# Patient Record
Sex: FEMALE | Race: WHITE | NOT HISPANIC OR LATINO | Employment: UNEMPLOYED | URBAN - METROPOLITAN AREA
[De-identification: names, ages, dates, MRNs, and addresses within clinical notes are randomized per-mention and may not be internally consistent; named-entity substitution may affect disease eponyms.]

---

## 2019-09-11 ENCOUNTER — LAB (OUTPATIENT)
Dept: LAB | Facility: HOSPITAL | Age: 49
End: 2019-09-11

## 2019-09-11 ENCOUNTER — TRANSCRIBE ORDERS (OUTPATIENT)
Dept: LAB | Facility: HOSPITAL | Age: 49
End: 2019-09-11

## 2019-09-11 DIAGNOSIS — R45.86 BRIEF COMPENSATORY MOOD SWINGS: ICD-10-CM

## 2019-09-11 DIAGNOSIS — R63.5 ABNORMAL WEIGHT GAIN: ICD-10-CM

## 2019-09-11 DIAGNOSIS — R45.86 BRIEF COMPENSATORY MOOD SWINGS: Primary | ICD-10-CM

## 2019-09-11 LAB
25(OH)D3 SERPL-MCNC: 42.5 NG/ML (ref 30–100)
ALBUMIN SERPL-MCNC: 4.6 G/DL (ref 3.5–5.2)
ALBUMIN/GLOB SERPL: 1.4 G/DL
ALP SERPL-CCNC: 65 U/L (ref 39–117)
ALT SERPL W P-5'-P-CCNC: 10 U/L (ref 1–33)
ANION GAP SERPL CALCULATED.3IONS-SCNC: 9.3 MMOL/L (ref 5–15)
AST SERPL-CCNC: 13 U/L (ref 1–32)
BILIRUB SERPL-MCNC: 0.4 MG/DL (ref 0.2–1.2)
BUN BLD-MCNC: 11 MG/DL (ref 6–20)
BUN/CREAT SERPL: 15.5 (ref 7–25)
CALCIUM SPEC-SCNC: 9.6 MG/DL (ref 8.6–10.5)
CHLORIDE SERPL-SCNC: 103 MMOL/L (ref 98–107)
CO2 SERPL-SCNC: 26.7 MMOL/L (ref 22–29)
CREAT BLD-MCNC: 0.71 MG/DL (ref 0.57–1)
DEPRECATED RDW RBC AUTO: 41.9 FL (ref 37–54)
ERYTHROCYTE [DISTWIDTH] IN BLOOD BY AUTOMATED COUNT: 12.9 % (ref 12.3–15.4)
GFR SERPL CREATININE-BSD FRML MDRD: 88 ML/MIN/1.73
GLOBULIN UR ELPH-MCNC: 3.3 GM/DL
GLUCOSE BLD-MCNC: 92 MG/DL (ref 65–99)
HBA1C MFR BLD: 5.71 % (ref 4.8–5.6)
HCT VFR BLD AUTO: 43.3 % (ref 34–46.6)
HGB BLD-MCNC: 14 G/DL (ref 12–15.9)
MCH RBC QN AUTO: 28.5 PG (ref 26.6–33)
MCHC RBC AUTO-ENTMCNC: 32.3 G/DL (ref 31.5–35.7)
MCV RBC AUTO: 88 FL (ref 79–97)
PLATELET # BLD AUTO: 196 10*3/MM3 (ref 140–450)
PMV BLD AUTO: 9.2 FL (ref 6–12)
POTASSIUM BLD-SCNC: 4.3 MMOL/L (ref 3.5–5.2)
PROT SERPL-MCNC: 7.9 G/DL (ref 6–8.5)
RBC # BLD AUTO: 4.92 10*6/MM3 (ref 3.77–5.28)
SODIUM BLD-SCNC: 139 MMOL/L (ref 136–145)
T4 SERPL-MCNC: 7.34 MCG/DL (ref 4.5–11.7)
TESTOST SERPL-MCNC: 23.9 NG/DL (ref 8.4–48.1)
TSH SERPL DL<=0.05 MIU/L-ACNC: 1.24 UIU/ML (ref 0.27–4.2)
VIT B12 BLD-MCNC: 419 PG/ML (ref 211–946)
WBC NRBC COR # BLD: 4.88 10*3/MM3 (ref 3.4–10.8)

## 2019-09-11 PROCEDURE — 83036 HEMOGLOBIN GLYCOSYLATED A1C: CPT | Performed by: NURSE PRACTITIONER

## 2019-09-11 PROCEDURE — 82306 VITAMIN D 25 HYDROXY: CPT | Performed by: NURSE PRACTITIONER

## 2019-09-11 PROCEDURE — 36415 COLL VENOUS BLD VENIPUNCTURE: CPT | Performed by: NURSE PRACTITIONER

## 2019-09-11 PROCEDURE — 84402 ASSAY OF FREE TESTOSTERONE: CPT

## 2019-09-11 PROCEDURE — 84403 ASSAY OF TOTAL TESTOSTERONE: CPT

## 2019-09-11 PROCEDURE — 82607 VITAMIN B-12: CPT

## 2019-09-11 PROCEDURE — 84436 ASSAY OF TOTAL THYROXINE: CPT

## 2019-09-11 PROCEDURE — 83525 ASSAY OF INSULIN: CPT

## 2019-09-11 PROCEDURE — 80050 GENERAL HEALTH PANEL: CPT | Performed by: NURSE PRACTITIONER

## 2019-09-13 LAB
INSULIN SERPL-ACNC: 9.1 UIU/ML
TESTOST FREE SERPL-MCNC: 1.5 PG/ML (ref 0–4.2)

## 2020-07-29 ENCOUNTER — LAB (OUTPATIENT)
Dept: LAB | Facility: HOSPITAL | Age: 50
End: 2020-07-29

## 2020-07-29 ENCOUNTER — TRANSCRIBE ORDERS (OUTPATIENT)
Dept: LAB | Facility: HOSPITAL | Age: 50
End: 2020-07-29

## 2020-07-29 DIAGNOSIS — R63.5 ABNORMAL WEIGHT GAIN: ICD-10-CM

## 2020-07-29 DIAGNOSIS — R63.5 ABNORMAL WEIGHT GAIN: Primary | ICD-10-CM

## 2020-07-29 LAB
ANION GAP SERPL CALCULATED.3IONS-SCNC: 9.1 MMOL/L (ref 5–15)
BUN SERPL-MCNC: 12 MG/DL (ref 6–20)
BUN/CREAT SERPL: 16.4 (ref 7–25)
CALCIUM SPEC-SCNC: 9.6 MG/DL (ref 8.6–10.5)
CHLORIDE SERPL-SCNC: 100 MMOL/L (ref 98–107)
CO2 SERPL-SCNC: 26.9 MMOL/L (ref 22–29)
CREAT SERPL-MCNC: 0.73 MG/DL (ref 0.57–1)
DEPRECATED RDW RBC AUTO: 41.6 FL (ref 37–54)
ERYTHROCYTE [DISTWIDTH] IN BLOOD BY AUTOMATED COUNT: 12.8 % (ref 12.3–15.4)
GFR SERPL CREATININE-BSD FRML MDRD: 85 ML/MIN/1.73
GLUCOSE SERPL-MCNC: 97 MG/DL (ref 65–99)
HBA1C MFR BLD: 5.67 % (ref 4.8–5.6)
HCT VFR BLD AUTO: 41.8 % (ref 34–46.6)
HGB BLD-MCNC: 13.3 G/DL (ref 12–15.9)
MCH RBC QN AUTO: 28.2 PG (ref 26.6–33)
MCHC RBC AUTO-ENTMCNC: 31.8 G/DL (ref 31.5–35.7)
MCV RBC AUTO: 88.6 FL (ref 79–97)
PLATELET # BLD AUTO: 220 10*3/MM3 (ref 140–450)
PMV BLD AUTO: 9 FL (ref 6–12)
POTASSIUM SERPL-SCNC: 4.2 MMOL/L (ref 3.5–5.2)
RBC # BLD AUTO: 4.72 10*6/MM3 (ref 3.77–5.28)
SODIUM SERPL-SCNC: 136 MMOL/L (ref 136–145)
T4 FREE SERPL-MCNC: 1.03 NG/DL (ref 0.93–1.7)
TSH SERPL DL<=0.05 MIU/L-ACNC: 0.85 UIU/ML (ref 0.27–4.2)
WBC # BLD AUTO: 4.44 10*3/MM3 (ref 3.4–10.8)

## 2020-07-29 PROCEDURE — 36415 COLL VENOUS BLD VENIPUNCTURE: CPT

## 2020-07-29 PROCEDURE — 80048 BASIC METABOLIC PNL TOTAL CA: CPT

## 2020-07-29 PROCEDURE — 83525 ASSAY OF INSULIN: CPT

## 2020-07-29 PROCEDURE — 83036 HEMOGLOBIN GLYCOSYLATED A1C: CPT

## 2020-07-29 PROCEDURE — 85027 COMPLETE CBC AUTOMATED: CPT

## 2020-07-29 PROCEDURE — 84443 ASSAY THYROID STIM HORMONE: CPT

## 2020-07-29 PROCEDURE — 84439 ASSAY OF FREE THYROXINE: CPT

## 2020-08-01 LAB — INSULIN SERPL-ACNC: 8.9 UIU/ML

## 2020-09-11 ENCOUNTER — TRANSCRIBE ORDERS (OUTPATIENT)
Dept: NUTRITION | Facility: HOSPITAL | Age: 50
End: 2020-09-11

## 2020-09-11 DIAGNOSIS — R63.5 ABNORMAL WEIGHT GAIN: Primary | ICD-10-CM

## 2020-09-21 ENCOUNTER — HOSPITAL ENCOUNTER (OUTPATIENT)
Dept: NUTRITION | Facility: HOSPITAL | Age: 50
Setting detail: RECURRING SERIES
Discharge: HOME | End: 2020-09-21

## 2020-09-21 VITALS — BODY MASS INDEX: 39.29 KG/M2 | WEIGHT: 235.8 LBS | HEIGHT: 65 IN

## 2020-09-21 PROCEDURE — 97802 MEDICAL NUTRITION INDIV IN: CPT

## 2020-09-21 NOTE — CONSULTS
Adult Outpatient Nutrition  Assessment/PES    Patient Name:  Luna Polk  YOB: 1970  MRN: [id number]    Assessment Date:  9/21/2020    Comments:  This medical referred consult was provided via telehealth as patient was unable to attend an in-office appointment today due to the COVID-19 crisis. Consent for treatment was given verbally. RD spent a total of 60 minutes with patient today. Pt was unable to complete the health literacy assessment prior to today's appt 2/2 telehealth. She discloses no barriers to learning at this time.     Pt reports problems with losing weight today. She has many food allergies that restrict her diet choices. She has been making changes over the past month to her diet that have been positive. Patient reports allergies to gluten, whey, casein, turkey, chicken, cow’s milk, peanuts, white potatoes pineapple, olives, mushrooms, and any yeast. She has been doing well with eliminating these foods from her diet. She reports her family hunts all of their meat so she consumes lean cuts of venison, pork, or beef and sometimes baked fish. She has been eating white rice or purple/yellow potatoes as her starches, and fresh, non-starchy vegetables. She likes to snack on a piece of fruit with peanut butter. Prior to making these changes 1 month ago she reports that she snacked on chips or sweets frequently. She has been overwhelmed with needing to make changes and is finally feeling more comfortable in doing so after starting with these initial changes to her diet.     RD spoke with pt about the importance of a balanced diet- incorporating all food groups to ensure proper nutrition for her body. She verbalized understanding and reports she has been trying to prepare balanced meals. RD shared recipe resource : www.diabetesfoodhub.org with pt to assist in meal planning. She has been tracking her calories and is comfortable using My Fitness Pal to continue doing so. VENANCIO spoke with pt  about the importance of portion sizes and nutrition label reading. Patient communicated understanding of these items and was able to utilize teach back when reading a nutrition facts label.   Patient has been working on her physical activity. She reports she walks 2 miles 3x per week with a friend and has been going to the gym for 60 minutes 2x per week with her . She reports she has only been doing cardio but would like to introduce strength training. RD encouraged pt to introduce as she feels comfortable. Patient seems motivated and I expect good adherence to goals set today.     Instructional process includes the plate method, nutrition label, meal planning, portions, restaurant nutrition, food record keeping, and exercise.    Materials provided include [facility] Weight Loss Toolkit, 1500 kcal meal plan and sample menu, and supporting nutrition education materials.     Goals:  1. Lose ½-1 lb per week- weigh self-weekly  2. Keep food diary 5 days per week via My Fitness Pal- adhere to 1500 kcal meal plan    Total of  60 minutes spent with patient on nutrition counseling. Education based on Academy of Dietetics and Nutrition guidelines. Patient was provided with RD's contact information. Follow up visit is scheduled for 10/12/20 at 10:30 AM . Thank you for this referral.       General Info     Row Name 09/21/20 1205       Today's Session    Person(s) attending today's session  Patient     Services Used Today?  No       General Information    How Well Do You Speak English?  very well    Do You Speak a Language Other Than English at Home?  no    Preferred Language  English    Are you able to read and write English?  Yes    Lives With  child(glenn), dependent;spouse    Is patient pregnant?  no        Physical Findings     Row Name 09/21/20 1205          Physical Findings    Overall Physical Appearance  -- unable to assess as visit was completed via phone         Anthropometrics     Row Name  "09/21/20 1227 09/21/20 1206       Anthropometrics    Height  165.1 cm (65\")  165.1 cm (65\")    Weight  --  107 kg (235 lb 12.8 oz)       Ideal Body Weight (IBW)    Ideal Body Weight (IBW) (kg)  57.29  57.29    % Ideal Body Weight  --  186.7       Body Mass Index (BMI)    BMI (kg/m2)  --  39.32        Nutritional Info/Activity     Row Name 09/21/20 1222       Nutritional Information    Have you had weight changes?  No    What is your desired body weight?  90.3 kg (199 lb)    Have you tried to lose weight before?  Yes    List programs tried, date, and success  low-carb- she did lose weight but it wasn't sustainable.    What is your motivation to lose weight?  Overall health    Food Allergies  other (see comments) many food allergies please see full note    History of eating disorder?  No    Do you have difficulty chewing food?  No    Functional Status  able to prepare meals;able to purchase food;ambulatory    List any food cravings/trigger foods you have  sweets and heavy carb starches    How often during the day do you find yourself snacking?  1-2 times per day    Food Behaviors  Stress eater;Boredom eater;Comfort eater    How often do you eat out and where?  1 x per week; sit down restaurant    Do you use Food Assistance programs (WIC, food stamps, food bank)?  no    Do you need information about Food Assistance programs?  no    How many times do you drink milk per day?  -- coconut milk 2 x per week    How many times do you eat fruit per day?  2    How many meals do you eat each day?  -- 1-2 per day    How many snacks do you eat each day?  -- 1-2 per day    What is the biggest challenge you have with your diet?  Knowledge;Food causing negative symptoms;Weight maintenance;Food preperation    What type of support do you currently use to help you with your health issues?   and friends    Enter everything you can remember eating in the last 24 hours (1 day)  AM: black coffee; L: 2 slices deli ham; D: 4 oz " "sirloin, house salad, Sprite @ [employer]       Eating Environment    Eating environment  Family;Social       Physical Activity    Are you currently involved in an activity/exercise program?   Yes    Describe physical activity  Walking 3 x per week; Gym 2x per week    How many minutes do you spend on exercise each day?  60    How would you rank exercise as an important health lifestyle practice?  10          Estimated/Assessed Needs     Row Name 09/21/20 1227 09/21/20 1206       Calculation Measurements    Weight Used For Calculations  107 kg (235 lb 12.8 oz)  --    Height  165.1 cm (65\")  165.1 cm (65\")       Estimated/Assessed Needs    Additional Documentation  [place]-St. Jeor Equation (Group)  --       [place]-St. Jeor Equation    RMR ([place]-St. Jeor Equation)  1700.455  --    [place]-St. Jeor Activity Factors  1.2  --    Activity Factors ([place]-St. Jeor)  2040.546  --                Problem/Interventions:  Problem 1     Row Name 09/21/20 1228          Nutrition Diagnoses Problem 1    Problem 1  Overweight/Obesity     Etiology (related to)  Factors Affecting Nutrition     Reported Allergies/Food Intolerance  -- see full note for allergies     Signs/Symptoms (evidenced by)  BMI     BMI  35 - 39.9                 Intervention Goal     Row Name 09/21/20 1229          Intervention Goal    General  Provide information regarding MNT for treatment/condition     PO  Meet estimated needs     Weight  Appropriate weight loss           Nutrition Prescription     Row Name 09/21/20 1231          Nutrition Prescription PO    PO Prescription  Begin/change diet     Begin/Change Diet to  Regular     Fluid Consistency  Thin     New PO Prescription Ordered?  No, recommended         Education/Evaluation     Row Name 09/21/20 1236          Education    Education  Provided education regarding     Provided education regarding  Diet rationale;Avoidance/improvement of symptoms;Key food habit change;Preventative health        " Monitor/Evaluation    Monitor  Per protocol;PO intake;Weight;Symptoms;Food/activity diary     Education Follow-up  Reinforce PRN will reinforce education @ f/u 10/12/20           Electronically signed by:  Tammi Estrada RD  09/21/20 12:37 EDT

## 2020-10-12 ENCOUNTER — HOSPITAL ENCOUNTER (OUTPATIENT)
Dept: NUTRITION | Facility: HOSPITAL | Age: 50
Setting detail: RECURRING SERIES
Discharge: HOME | End: 2020-10-12

## 2020-10-12 NOTE — PROGRESS NOTES
Outpatient Nutrition Services    Patient Name:  Luna Polk  YOB: 1970  MRN: [id number]  Admit Date:  10/12/2020    This medical referred consult follow-up was provided via telehealth as patient was unable to attend an in-office appointment today due to the COVID-19 crisis. Consent for treatment was given verbally. RD spent a total of 30 minutes with patient today.     Patient reports for her 1 month follow up by phone today. She is pleased with the progress she has been making overall. She reports that she sometimes struggles with consistent tracking on My Fitness Pal but has been trying to keep up. RD recommended pt track the night before if plausible to assist in cutting down on forgetfulness and time spent tracking throughout the day. Pt is agreeable to trying this. Patient reports that she does not eat large meals as she does not often find herself hungry throughout the day. She reports many of her tracked days are averaging ~ 1100 kcal per day rather than her goal of 1500 kcal per day. RD encouraged pt to incorporate a couple of healthy snacks into her day to get closer to her goal of 1500 kcal per day. Pt reports she will attempt this. Patient reports she has lost 6 lb since initial visit with her healthy habits implemented. Patient has been consistently walking with a friend 2-4 times per week and going to the gym for 45 minutes at least 2 times per week. She has begun to implement at least 20 minutes of strength training at the gym with a resistance arm machine and 5 and 8 lb free weights. She is feeling motivated and pleased with her progress at this time. RD encouraged pt to call with any questions. Continued follow-up scheduled for 12/7/20 @ 1030 AM. Thank you for this referral.        Goals:  1. Lose ½-1 lb per week- weigh self-weekly-100% met (6 lb down)  2. Keep food diary 5 days per week via My Fitness Pal- adhere to 1500 kcal meal plan-100% met       Electronically signed  by:  Tammi Estrada RD  10/12/20 10:52 EDT

## 2020-12-07 ENCOUNTER — HOSPITAL ENCOUNTER (OUTPATIENT)
Dept: NUTRITION | Facility: HOSPITAL | Age: 50
Setting detail: RECURRING SERIES
Discharge: HOME | End: 2020-12-07

## 2020-12-07 PROCEDURE — 97803 MED NUTRITION INDIV SUBSEQ: CPT

## 2020-12-07 NOTE — PROGRESS NOTES
"Outpatient Nutrition Services    Patient Name:  Luna Polk  YOB: 1970  MRN: [id number]  Admit Date:  12/7/2020    This medical referred consult follow-up was provided via telehealth as patient was unable to attend an in-office appointment today due to the COVID-19 crisis. Consent for treatment was given verbally. RD spent a total of 30 minutes with patient today.     Pt reports for her continued follow up visit via phone today. She states that she has \"fallen off the wagon\" as she has a lot of stressors at home currently. 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 She reports that she wants to get back on track as the way she has been eating is affecting her skin and sleep. RD spoke with pt about starting small. Commended pt for still making an effort to attend our appt today. We spoke about making sure she is eating protein first at meals and snacks to ensure she is not overdoing it on empty calorie snack foods. RD recommended pt focus on getting 64 oz water per day. She reports she is trying to keep up with healthy snacks. Her  is holding her accountable to get her to the gym again, she reports she plans to go tonight. Pt wants to make changes and effort is noticeable. RD providing pt with further healthy snack/high protein snack ideas for her to keep on hand due to her current busy lifestyle. A continued follow up visit has been scheduled for 1/12/2021 @ 11:30 AM.     Goals for next visit:  -64 oz water per day  -increased protein intake at meals and snacks   -speak with PCP and reschedule appt that has been cancelled       Electronically signed by:  Tammi Estrada RD  12/07/20 11:11 EST   "

## 2024-03-20 ENCOUNTER — OFFICE VISIT (OUTPATIENT)
Dept: INTERNAL MEDICINE | Facility: CLINIC | Age: 54
End: 2024-03-20
Payer: COMMERCIAL

## 2024-03-20 VITALS
WEIGHT: 214.8 LBS | OXYGEN SATURATION: 100 % | SYSTOLIC BLOOD PRESSURE: 128 MMHG | RESPIRATION RATE: 18 BRPM | DIASTOLIC BLOOD PRESSURE: 68 MMHG | TEMPERATURE: 97.9 F | HEART RATE: 76 BPM | HEIGHT: 65 IN | BODY MASS INDEX: 35.79 KG/M2

## 2024-03-20 DIAGNOSIS — Z13.0 SCREENING FOR ENDOCRINE, NUTRITIONAL, METABOLIC AND IMMUNITY DISORDER: ICD-10-CM

## 2024-03-20 DIAGNOSIS — Z13.228 SCREENING FOR ENDOCRINE, NUTRITIONAL, METABOLIC AND IMMUNITY DISORDER: ICD-10-CM

## 2024-03-20 DIAGNOSIS — Z00.00 PHYSICAL EXAM, ANNUAL: Primary | ICD-10-CM

## 2024-03-20 DIAGNOSIS — M25.50 PAIN IN JOINT INVOLVING MULTIPLE SITES: ICD-10-CM

## 2024-03-20 DIAGNOSIS — Z13.21 SCREENING FOR ENDOCRINE, NUTRITIONAL, METABOLIC AND IMMUNITY DISORDER: ICD-10-CM

## 2024-03-20 DIAGNOSIS — E55.9 VITAMIN D INSUFFICIENCY: ICD-10-CM

## 2024-03-20 DIAGNOSIS — Z13.29 SCREENING FOR ENDOCRINE, NUTRITIONAL, METABOLIC AND IMMUNITY DISORDER: ICD-10-CM
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
## 2024-03-25 LAB
25(OH)D3+25(OH)D2 SERPL-MCNC: 59.3 NG/ML (ref 30–100)
ALBUMIN SERPL-MCNC: 4.3 G/DL (ref 3.8–4.9)
ALBUMIN/GLOB SERPL: 1.3 {RATIO} (ref 1.2–2.2)
ALP SERPL-CCNC: 72 IU/L (ref 44–121)
ALT SERPL-CCNC: 10 IU/L (ref 0–32)
ANA SER QL IF: POSITIVE
ANA SPECKLED TITR SER: ABNORMAL {TITER}
AST SERPL-CCNC: 15 IU/L (ref 0–40)
B BURGDOR DNA SPEC QL NAA+PROBE: NEGATIVE
BASOPHILS # BLD AUTO: 0 X10E3/UL (ref 0–0.2)
BASOPHILS NFR BLD AUTO: 1 %
BILIRUB SERPL-MCNC: 0.3 MG/DL (ref 0–1.2)
BUN SERPL-MCNC: 14 MG/DL (ref 6–24)
BUN/CREAT SERPL: 18 (ref 9–23)
CALCIUM SERPL-MCNC: 9.8 MG/DL (ref 8.7–10.2)
CENTROMERE B AB SER-ACNC: <0.2 AI (ref 0–0.9)
CHLORIDE SERPL-SCNC: 102 MMOL/L (ref 96–106)
CHOLEST SERPL-MCNC: 219 MG/DL (ref 100–199)
CHROMATIN AB SERPL-ACNC: <0.2 AI (ref 0–0.9)
CO2 SERPL-SCNC: 22 MMOL/L (ref 20–29)
CREAT SERPL-MCNC: 0.8 MG/DL (ref 0.57–1)
CRP SERPL-MCNC: 4 MG/L (ref 0–10)
DSDNA AB SER-ACNC: <1 IU/ML (ref 0–9)
EGFRCR SERPLBLD CKD-EPI 2021: 89 ML/MIN/1.73
ENA JO1 AB SER-ACNC: <0.2 AI (ref 0–0.9)
ENA RNP AB SER-ACNC: 2.9 AI (ref 0–0.9)
ENA SCL70 AB SER-ACNC: <0.2 AI (ref 0–0.9)
ENA SM AB SER-ACNC: <0.2 AI (ref 0–0.9)
ENA SS-A AB SER-ACNC: >8 AI (ref 0–0.9)
ENA SS-B AB SER-ACNC: 0.3 AI (ref 0–0.9)
EOSINOPHIL # BLD AUTO: 0.1 X10E3/UL (ref 0–0.4)
EOSINOPHIL NFR BLD AUTO: 2 %
ERYTHROCYTE [DISTWIDTH] IN BLOOD BY AUTOMATED COUNT: 13.1 % (ref 11.7–15.4)
ERYTHROCYTE [SEDIMENTATION RATE] IN BLOOD BY WESTERGREN METHOD: 14 MM/HR (ref 0–40)
GLOBULIN SER CALC-MCNC: 3.2 G/DL (ref 1.5–4.5)
GLUCOSE SERPL-MCNC: 89 MG/DL (ref 70–99)
HBA1C MFR BLD: 5.3 % (ref 4.8–5.6)
HCT VFR BLD AUTO: 46.3 % (ref 34–46.6)
HDLC SERPL-MCNC: 78 MG/DL
HGB BLD-MCNC: 15 G/DL (ref 11.1–15.9)
IMM GRANULOCYTES # BLD AUTO: 0 X10E3/UL (ref 0–0.1)
IMM GRANULOCYTES NFR BLD AUTO: 0 %
LABORATORY COMMENT REPORT: ABNORMAL
LDLC SERPL CALC-MCNC: 125 MG/DL (ref 0–99)
LYMPHOCYTES # BLD AUTO: 1.6 X10E3/UL (ref 0.7–3.1)
LYMPHOCYTES NFR BLD AUTO: 39 %
Lab: ABNORMAL
Lab: ABNORMAL
MCH RBC QN AUTO: 28.5 PG (ref 26.6–33)
MCHC RBC AUTO-ENTMCNC: 32.4 G/DL (ref 31.5–35.7)
MCV RBC AUTO: 88 FL (ref 79–97)
MONOCYTES # BLD AUTO: 0.2 X10E3/UL (ref 0.1–0.9)
MONOCYTES NFR BLD AUTO: 6 %
NEUTROPHILS # BLD AUTO: 2.2 X10E3/UL (ref 1.4–7)
NEUTROPHILS NFR BLD AUTO: 52 %
PLATELET # BLD AUTO: 217 X10E3/UL (ref 150–450)
POTASSIUM SERPL-SCNC: 4.8 MMOL/L (ref 3.5–5.2)
PROT SERPL-MCNC: 7.5 G/DL (ref 6–8.5)
RBC # BLD AUTO: 5.26 X10E6/UL (ref 3.77–5.28)
RICKETTSIA RICKETTSII DNA, RT: NOT DETECTED
SODIUM SERPL-SCNC: 138 MMOL/L (ref 134–144)
T4 FREE SERPL-MCNC: 1.12 NG/DL (ref 0.82–1.77)
TRIGL SERPL-MCNC: 91 MG/DL (ref 0–149)
TSH SERPL DL<=0.005 MIU/L-ACNC: 1.19 UIU/ML (ref 0.45–4.5)
VIT B12 SERPL-MCNC: 644 PG/ML (ref 232–1245)
VLDLC SERPL CALC-MCNC: 16 MG/DL (ref 5–40)
WBC # BLD AUTO: 4.2 X10E3/UL (ref 3.4–10.8)

## 2024-03-26 ENCOUNTER — TELEPHONE (OUTPATIENT)
Dept: INTERNAL MEDICINE | Facility: CLINIC | Age: 54
End: 2024-03-26
Payer: COMMERCIAL

## 2024-03-26 NOTE — TELEPHONE ENCOUNTER
Caller: Luna Polk    Relationship: Self    Best call back number: [phone]    What form or medical record are you requesting: COPY OF LAB RESULTS FOR C REACTIVE PROTEIN, CBC, SED RATE, CMP AND EMANUEL    Who is requesting this form or medical record from you: DERMATOLOGIST    How would you like to receive the form or medical records (pick-up, mail, fax): FAX    If fax, what is the fax number: [phone]  If mail, what is the address:   If pick-up, provide patient with address and location details    Timeframe paperwork needed: ASAP    Additional notes: ATTMARTIN LEPE

## 2024-04-10 DIAGNOSIS — M25.50 PAIN IN JOINT INVOLVING MULTIPLE SITES: Primary | ICD-10-CM

## 2024-04-10 DIAGNOSIS — R76.8 POSITIVE ANA (ANTINUCLEAR ANTIBODY): ICD-10-CM

## 2024-08-01 ENCOUNTER — OFFICE VISIT (OUTPATIENT)
Dept: INTERNAL MEDICINE | Facility: CLINIC | Age: 54
End: 2024-08-01
Payer: COMMERCIAL

## 2024-08-01 VITALS
WEIGHT: 208 LBS | SYSTOLIC BLOOD PRESSURE: 120 MMHG | DIASTOLIC BLOOD PRESSURE: 84 MMHG | HEART RATE: 73 BPM | TEMPERATURE: 97.5 F | BODY MASS INDEX: 34.66 KG/M2 | OXYGEN SATURATION: 98 % | HEIGHT: 65 IN

## 2024-08-01 DIAGNOSIS — J45.41 MODERATE PERSISTENT ASTHMA WITH ACUTE EXACERBATION: Primary | ICD-10-CM

## 2024-08-01 PROCEDURE — 1126F AMNT PAIN NOTED NONE PRSNT: CPT | Performed by: STUDENT IN AN ORGANIZED HEALTH CARE EDUCATION/TRAINING PROGRAM

## 2024-08-01 PROCEDURE — 99214 OFFICE O/P EST MOD 30 MIN: CPT | Performed by: STUDENT IN AN ORGANIZED HEALTH CARE EDUCATION/TRAINING PROGRAM

## 2024-08-01 RX ORDER — HYDROXYCHLOROQUINE SULFATE 200 MG/1
400 TABLET, FILM COATED ORAL DAILY
COMMUNITY
Start: 2024-07-31 | End: 2024-09-29

## 2024-08-01 RX ORDER — PREDNISONE 10 MG/1
10 TABLET ORAL DAILY
Qty: 6 TABLET | Refills: 0 | Status: SHIPPED | OUTPATIENT
Start: 2024-08-01

## 2024-08-01 RX ORDER — ALBUTEROL SULFATE 90 UG/1
2 AEROSOL, METERED RESPIRATORY (INHALATION) 4 TIMES DAILY
COMMUNITY
Start: 2024-07-31

## 2024-08-01 RX ORDER — MOMETASONE FUROATE AND FORMOTEROL FUMARATE DIHYDRATE 200; 5 UG/1; UG/1
2 AEROSOL RESPIRATORY (INHALATION)
Qty: 1 EACH | Refills: 11 | Status: SHIPPED | OUTPATIENT
Start: 2024-08-01

## 2024-08-01 NOTE — PROGRESS NOTES
Subjective   Luna Polk is a 53 y.o. female.     History of Present Illness  Patient presents with complaints of acute asthma.  States that she was diagnosed with asthma recently and was put on albuterol inhaler though it has not been helpful.  She states over the past 3 or 4 days she feels like she cannot catch her breath.  States that her chest just feels really tight and she cannot get air in.  Denies cough.  States that the albuterol has been making her really jittery and keeping her from sleeping.  Denies wheeze or cough      The following portions of the patient's history were reviewed and updated as appropriate: allergies, current medications, past family history, past medical history, past social history, past surgical history, and problem list.    Review of Systems   All other systems reviewed and are negative.      Objective   Physical Exam  Vitals and nursing note reviewed.   Constitutional:       Appearance: Normal appearance.   HENT:      Head: Normocephalic and atraumatic.      Right Ear: External ear normal.      Left Ear: External ear normal.      Nose: Nose normal.      Mouth/Throat:      Mouth: Mucous membranes are moist.      Pharynx: Oropharynx is clear. No oropharyngeal exudate or posterior oropharyngeal erythema.   Eyes:      Extraocular Movements: Extraocular movements intact.      Conjunctiva/sclera: Conjunctivae normal.      Pupils: Pupils are equal, round, and reactive to light.   Cardiovascular:      Rate and Rhythm: Normal rate and regular rhythm.      Pulses: Normal pulses.      Heart sounds: Normal heart sounds.   Pulmonary:      Effort: Pulmonary effort is normal.      Breath sounds: Normal breath sounds.   Abdominal:      General: Abdomen is flat. Bowel sounds are normal.      Palpations: Abdomen is soft.   Musculoskeletal:         General: Normal range of motion.      Cervical back: Normal range of motion.   Skin:     General: Skin is warm.      Capillary Refill: Capillary  refill takes less than 2 seconds.   Neurological:      General: No focal deficit present.      Mental Status: She is alert and oriented to person, place, and time. Mental status is at baseline.   Psychiatric:         Mood and Affect: Mood normal.         Behavior: Behavior normal.         Thought Content: Thought content normal.         Judgment: Judgment normal.         Assessment & Plan   Diagnoses and all orders for this visit:    1. Moderate persistent asthma with acute exacerbation (Primary)  -     predniSONE (DELTASONE) 10 MG tablet; Take 1 tablet by mouth Daily.  Dispense: 6 tablet; Refill: 0  -     mometasone-formoterol (Dulera) 200-5 MCG/ACT inhaler; Inhale 2 puffs 2 (Two) Times a Day.  Dispense: 1 each; Refill: 11    Patient seen for chronic worsening condition  Started patient on a maintenance inhaler.  Counseled to use albuterol as needed on top of the maintenance inhaler.  Patient states that she does not like steroid packs as the beginning doses are too strong for her and she would prefer to take a smaller dose of steroid to help with her exacerbation.

## 2024-10-23 ENCOUNTER — OFFICE VISIT (OUTPATIENT)
Dept: INTERNAL MEDICINE | Facility: CLINIC | Age: 54
End: 2024-10-23
Payer: COMMERCIAL

## 2024-10-23 VITALS
TEMPERATURE: 98 F | RESPIRATION RATE: 17 BRPM | BODY MASS INDEX: 35.99 KG/M2 | DIASTOLIC BLOOD PRESSURE: 38 MMHG | HEART RATE: 71 BPM | WEIGHT: 216 LBS | OXYGEN SATURATION: 99 % | HEIGHT: 65 IN | SYSTOLIC BLOOD PRESSURE: 112 MMHG

## 2024-10-23 DIAGNOSIS — E66.812 CLASS 2 SEVERE OBESITY DUE TO EXCESS CALORIES WITH SERIOUS COMORBIDITY AND BODY MASS INDEX (BMI) OF 35.0 TO 35.9 IN ADULT: ICD-10-CM

## 2024-10-23 DIAGNOSIS — D89.89 AUTOIMMUNE DISORDER: ICD-10-CM

## 2024-10-23 DIAGNOSIS — J45.40 MODERATE PERSISTENT ASTHMA WITHOUT COMPLICATION: Primary | ICD-10-CM

## 2024-10-23 DIAGNOSIS — E66.01 CLASS 2 SEVERE OBESITY DUE TO EXCESS CALORIES WITH SERIOUS COMORBIDITY AND BODY MASS INDEX (BMI) OF 35.0 TO 35.9 IN ADULT: ICD-10-CM

## 2024-10-23 DIAGNOSIS — Z87.898 HISTORY OF PREDIABETES: ICD-10-CM

## 2024-10-23 DIAGNOSIS — K22.4 ESOPHAGEAL SPASM: ICD-10-CM

## 2024-10-23 DIAGNOSIS — Z12.31 ENCOUNTER FOR SCREENING MAMMOGRAM FOR MALIGNANT NEOPLASM OF BREAST: ICD-10-CM

## 2024-10-23 LAB
EXPIRATION DATE: NORMAL
HBA1C MFR BLD: 5 % (ref 4.5–5.7)
Lab: NORMAL

## 2024-10-23 PROCEDURE — 99214 OFFICE O/P EST MOD 30 MIN: CPT | Performed by: NURSE PRACTITIONER

## 2024-10-23 PROCEDURE — 83036 HEMOGLOBIN GLYCOSYLATED A1C: CPT | Performed by: NURSE PRACTITIONER

## 2024-10-23 PROCEDURE — 1126F AMNT PAIN NOTED NONE PRSNT: CPT | Performed by: NURSE PRACTITIONER

## 2024-10-23 PROCEDURE — 3044F HG A1C LEVEL LT 7.0%: CPT | Performed by: NURSE PRACTITIONER

## 2024-10-23 PROCEDURE — 1159F MED LIST DOCD IN RCRD: CPT | Performed by: NURSE PRACTITIONER

## 2024-10-23 PROCEDURE — 1160F RVW MEDS BY RX/DR IN RCRD: CPT | Performed by: NURSE PRACTITIONER

## 2024-10-23 RX ORDER — BUDESONIDE AND FORMOTEROL FUMARATE DIHYDRATE 80; 4.5 UG/1; UG/1
2 AEROSOL RESPIRATORY (INHALATION)
Qty: 10.2 G | Refills: 6 | Status: SHIPPED | OUTPATIENT
Start: 2024-10-23

## 2024-10-23 RX ORDER — HYDROXYCHLOROQUINE SULFATE 200 MG/1
TABLET, FILM COATED ORAL 2 TIMES DAILY
COMMUNITY

## 2024-10-23 NOTE — PROGRESS NOTES
Chief Complaint / Reason:      Chief Complaint   Patient presents with    Asthma     F/u from last visit w/ Dr. Ohara        Subjective     History of Present Illness  The patient is a 54-year-old female here for a follow-up visit with Dr. Ohara    She has a history of asthma, confirmed through a pulmonary function test, and has been under the care of a pulmonologist. She was prescribed a Ventolin inhaler and later Dulera by Dr. Ohara. However, she does not like Dulera due to her anxiety and has not taken it for the past 2 days, resulting in wheezing. Cold weather exacerbates her asthma symptoms.    She also reports experiencing esophageal spasms and lightheadedness.     Her medical history includes lupus, Sjogren's syndrome, and Raynaud's phenomenon. She has been on a low dose of steroids and has gained some weight. She walks every morning.    She underwent a colonoscopy, which showed no abnormalities. In 10/2023, she had a myomectomy, but the fibroids were not removed. She is due for her annual mammogram and is considering a hysterectomy due to the presence of large fibroids.    History taken from: patient    PMH/FH/Social History were reviewed and updated appropriately in the electronic medical record.   Past Medical History:   Diagnosis Date    Uterine fibroid      Past Surgical History:   Procedure Laterality Date     SECTION      UTERINE FIBROID SURGERY       Social History     Socioeconomic History    Marital status:    Tobacco Use    Smoking status: Former     Current packs/day: 1.00     Average packs/day: 1 pack/day for 25.0 years (25.0 ttl pk-yrs)     Types: Cigarettes     Start date: 1999    Smokeless tobacco: Never   Vaping Use    Vaping status: Never Used   Substance and Sexual Activity    Alcohol use: Yes     Comment: occ    Drug use: Defer    Sexual activity: Defer     Family History   Problem Relation Age of Onset    Dementia Mother     Lung cancer Father        Review of  Systems:   Review of Systems      All other systems were reviewed and are negative.  Exceptions are noted in the subjective or above.      Objective     Vital Signs  Vitals:    10/23/24 1248   BP: (!) 112/38   Pulse: 71   Resp: 17   Temp: 98 °F (36.7 °C)   SpO2: 99%       Body mass index is 35.94 kg/m².  Class 2 Severe Obesity (BMI >=35 and <=39.9). Obesity-related health conditions include the following: dyslipidemias and GERD. Obesity is improving with lifestyle modifications. BMI is is above average; BMI management plan is completed. We discussed low calorie, low carb based diet program, portion control, increasing exercise, joining a fitness center or start home based exercise program, Weight Watchers or other Commercial based weight reduction program, and management of depression/anxiety/stress to control compensatory eating.       Physical Exam  Vitals and nursing note reviewed.   Constitutional:       General: She is not in acute distress.     Appearance: She is well-developed. She is obese.   Cardiovascular:      Rate and Rhythm: Normal rate and regular rhythm.      Pulses: Normal pulses.      Heart sounds: Normal heart sounds.   Pulmonary:      Effort: Pulmonary effort is normal.      Breath sounds: Wheezing present.   Chest:      Chest wall: Tenderness present.   Musculoskeletal:         General: Tenderness and deformity present. Normal range of motion.   Skin:     General: Skin is warm and dry.      Capillary Refill: Capillary refill takes less than 2 seconds.      Coloration: Skin is not pale.      Findings: No erythema or rash.   Neurological:      Mental Status: She is alert and oriented to person, place, and time.   Psychiatric:         Behavior: Behavior normal.         Thought Content: Thought content normal.         Judgment: Judgment normal.              Results Review:    I reviewed the patient's new clinical results.       Medication Review:     Current Outpatient Medications:     albuterol  sulfate  (90 Base) MCG/ACT inhaler, Inhale 2 puffs 4 (Four) Times a Day., Disp: , Rfl:     hydroxychloroquine (PLAQUENIL) 200 MG tablet, Take  by mouth 2 (Two) Times a Day., Disp: , Rfl:     budesonide-formoterol (Symbicort) 80-4.5 MCG/ACT inhaler, Inhale 2 puffs 2 (Two) Times a Day., Disp: 10.2 g, Rfl: 6    Diagnoses and all orders for this visit:    Moderate persistent asthma without complication  -     budesonide-formoterol (Symbicort) 80-4.5 MCG/ACT inhaler; Inhale 2 puffs 2 (Two) Times a Day.    History of prediabetes  -     POC Glycosylated Hemoglobin (Hb A1C)    Encounter for screening mammogram for malignant neoplasm of breast  -     Mammo Screening Digital Tomosynthesis Bilateral With CAD    Autoimmune disorder  -     hydroxychloroquine (PLAQUENIL) 200 MG tablet; Take  by mouth 2 (Two) Times a Day.    Esophageal spasm    Class 2 severe obesity due to excess calories with serious comorbidity and body mass index (BMI) of 35.0 to 35.9 in adult      Assessment & Plan  1. Asthma.  Her pulmonary function test indicates a significant bronchodilator response, suggesting a potential diagnosis of asthma. She reports anxiety with the use of Dulera and has been prescribed Symbicort 80 mcg for daily use. She is advised to rinse her mouth after use and maintain good oral hygiene. She should carry her albuterol inhaler for use every 4 to 6 hours as needed. Regular walking and deep breathing exercises have been recommended to improve lung function. She is also advised to monitor her blood pressure at home due to a recent low reading of 112/38.    2. Esophageal spasms.  She reports experiencing esophageal spasms, particularly when walking outside in cool weather. She is advised to continue using peppermint as it provides relief.    3. Multiple autoimmune conditions (Lupus, Sjogren's, Raynaud's).  She has been diagnosed with multiple autoimmune conditions including lupus, Sjogren's, and Raynaud's. She is currently on  hydroxychloroquine and reports significant improvement in symptoms after consistent use.    4. Health Maintenance.  A mammogram has been ordered. She is advised to consult with her gynecologist regarding her fibroids, especially considering her myomectomy last October. She will provide the date of her last colonoscopy via [employer].      Return if symptoms worsen or fail to improve.    TACO Guevara  10/23/2024    Patient or patient representative verbalized consent for the use of Ambient Listening during the visit with  TACO Guevara for chart documentation.

## 2025-03-13 ENCOUNTER — OFFICE VISIT (OUTPATIENT)
Dept: INTERNAL MEDICINE | Facility: CLINIC | Age: 55
End: 2025-03-13
Payer: COMMERCIAL

## 2025-03-13 VITALS
HEART RATE: 63 BPM | BODY MASS INDEX: 35.65 KG/M2 | OXYGEN SATURATION: 97 % | SYSTOLIC BLOOD PRESSURE: 110 MMHG | HEIGHT: 65 IN | TEMPERATURE: 97.3 F | WEIGHT: 214 LBS | DIASTOLIC BLOOD PRESSURE: 78 MMHG

## 2025-03-13 DIAGNOSIS — R06.83 SNORING: Primary | ICD-10-CM

## 2025-03-13 DIAGNOSIS — E66.812 CLASS 2 SEVERE OBESITY DUE TO EXCESS CALORIES WITH SERIOUS COMORBIDITY AND BODY MASS INDEX (BMI) OF 35.0 TO 35.9 IN ADULT: ICD-10-CM

## 2025-03-13 DIAGNOSIS — E66.01 CLASS 2 SEVERE OBESITY DUE TO EXCESS CALORIES WITH SERIOUS COMORBIDITY AND BODY MASS INDEX (BMI) OF 35.0 TO 35.9 IN ADULT: ICD-10-CM

## 2025-03-13 DIAGNOSIS — D89.89 AUTOIMMUNE DISORDER: ICD-10-CM

## 2025-03-13 PROBLEM — K21.9 ACID REFLUX: Status: ACTIVE | Noted: 2024-05-06

## 2025-03-13 PROBLEM — K22.4 DIFFUSE SPASM OF ESOPHAGUS: Status: ACTIVE | Noted: 2024-07-12

## 2025-03-13 PROBLEM — E66.9 OBESITY: Status: ACTIVE | Noted: 2025-03-13

## 2025-03-13 PROCEDURE — 99214 OFFICE O/P EST MOD 30 MIN: CPT | Performed by: NURSE PRACTITIONER

## 2025-03-13 PROCEDURE — 1159F MED LIST DOCD IN RCRD: CPT | Performed by: NURSE PRACTITIONER

## 2025-03-13 PROCEDURE — 1126F AMNT PAIN NOTED NONE PRSNT: CPT | Performed by: NURSE PRACTITIONER

## 2025-03-13 PROCEDURE — 1160F RVW MEDS BY RX/DR IN RCRD: CPT | Performed by: NURSE PRACTITIONER

## 2025-03-13 RX ORDER — PREDNISONE 5 MG/1
5 TABLET ORAL DAILY
COMMUNITY

## 2025-03-13 NOTE — PROGRESS NOTES
Chief Complaint / Reason:      Chief Complaint   Patient presents with    Fibroids     Weight loss       Subjective     History of Present Illness  The patient is a 54-year-old female presenting for weight loss and uterine fibroids.    Diagnosed with uterine fibroids, unsuccessfully removed by Dr. Enriquez last year due to size. Declined hysterectomy. Fibroids not adherent to bowel wall, no complications. Underwent ablation in 2024, resulting in amenorrhea. Reports intermittent cramping and spotting over the past two months. Concerned about fibroid rupture or malignancy.    History of lupus with three flare-ups since starting medication. Occasionally takes prednisone. Reports difficulty concentrating, attributing it to lifelong ADHD. Despite adequate sleep, experiences morning fatigue. No sleep study done. Wakes up once at night around 3 AM, does not always snore, occasionally naps around 4 PM. Feels mentally scattered, hormones not recently checked.    Previously discussed semaglutide for weight loss but found it unaffordable. Inquires about Rybelsus, prescribed to her non-diabetic sister. Lost 119 pounds on semaglutide over a year, now weighs 198 pounds. Tried other weight loss medications, has not tried Zepbound due to lack of prescription.    Has asthma, finds Symbicort more effective than previous treatments but still experiences tightness. No smoking for 30 years. Reports no chest pain but experiences wheezing. No pulmonary function tests done.    SOCIAL HISTORY  She has not smoked for 30 years.    MEDICATIONS  Current: Prednisone, Symbicort, semaglutide.    History taken from: patient    PMH/FH/Social History were reviewed and updated appropriately in the electronic medical record.   Past Medical History:   Diagnosis Date    Uterine fibroid      Past Surgical History:   Procedure Laterality Date     SECTION      UTERINE FIBROID SURGERY       Social History     Socioeconomic History    Marital  status:    Tobacco Use    Smoking status: Former     Current packs/day: 0.00     Average packs/day: 1 pack/day for 10.8 years (10.8 ttl pk-yrs)     Types: Cigarettes     Start date:      Quit date: 1999     Years since quittin.3    Smokeless tobacco: Never   Vaping Use    Vaping status: Never Used   Substance and Sexual Activity    Alcohol use: Yes     Comment: occ    Drug use: Defer    Sexual activity: Defer     Family History   Problem Relation Age of Onset    Dementia Mother     Lung cancer Father        Review of Systems:   Review of Systems      All other systems were reviewed and are negative.  Exceptions are noted in the subjective or above.      Objective     Vital Signs  Vitals:    25 1448   BP: 110/78   Pulse: 63   Temp: 97.3 °F (36.3 °C)   SpO2: 97%       Body mass index is 35.61 kg/m².  Class 2 Severe Obesity (BMI >=35 and <=39.9). Obesity-related health conditions include the following: impaired fasting glucose, dyslipidemias, and GERD. Obesity is worsening. BMI is is above average; BMI management plan is completed. We discussed low calorie, low carb based diet program, portion control, increasing exercise, joining a fitness center or start home based exercise program, Weight Watchers or other Commercial based weight reduction program, and management of depression/anxiety/stress to control compensatory eating.       Physical Exam  Vitals and nursing note reviewed.   Constitutional:       General: She is not in acute distress.     Appearance: She is well-developed. She is obese.   Cardiovascular:      Rate and Rhythm: Normal rate and regular rhythm.      Pulses: Normal pulses.      Heart sounds: Normal heart sounds.   Pulmonary:      Effort: Pulmonary effort is normal.      Breath sounds: Normal breath sounds. No wheezing.   Chest:      Chest wall: No tenderness.   Musculoskeletal:         General: Tenderness and deformity present. Normal range of motion.   Skin:     General:  Skin is warm and dry.      Capillary Refill: Capillary refill takes less than 2 seconds.      Coloration: Skin is not pale.      Findings: No erythema or rash.   Neurological:      Mental Status: She is alert and oriented to person, place, and time.   Psychiatric:         Behavior: Behavior normal.         Thought Content: Thought content normal.         Judgment: Judgment normal.              Results Review:    I reviewed the patient's new clinical results.       Medication Review:     Current Outpatient Medications:     albuterol sulfate  (90 Base) MCG/ACT inhaler, Inhale 2 puffs 4 (Four) Times a Day., Disp: , Rfl:     budesonide-formoterol (Symbicort) 80-4.5 MCG/ACT inhaler, Inhale 2 puffs 2 (Two) Times a Day., Disp: 10.2 g, Rfl: 6    hydroxychloroquine (PLAQUENIL) 200 MG tablet, Take  by mouth 2 (Two) Times a Day., Disp: , Rfl:     predniSONE (DELTASONE) 5 MG tablet, Take 1 tablet by mouth Daily., Disp: , Rfl:     Tirzepatide-Weight Management (ZEPBOUND) 2.5 MG/0.5ML solution auto-injector, Inject 0.5 mL under the skin into the appropriate area as directed 1 (One) Time Per Week., Disp: 2 mL, Rfl: 1    Diagnoses and all orders for this visit:    Snoring  -     Ambulatory Referral to Sleep Medicine    Class 2 severe obesity due to excess calories with serious comorbidity and body mass index (BMI) of 35.0 to 35.9 in adult  -     Tirzepatide-Weight Management (ZEPBOUND) 2.5 MG/0.5ML solution auto-injector; Inject 0.5 mL under the skin into the appropriate area as directed 1 (One) Time Per Week.    Autoimmune disorder    Other orders  -     predniSONE (DELTASONE) 5 MG tablet; Take 1 tablet by mouth Daily.      Assessment & Plan  1. Uterine fibroids  - No gastrointestinal issues or pain  - Not adherent to bowel wall, typically benign  - Pap smear today  - Consult Dr. Enriquez for transvaginal ultrasound    2. Lupus  - Three flare-ups since starting medication  - Occasionally takes prednisone  - Continue current  medication  - Follow up with rheumatologist as needed    3. Asthma  - Symbicort improved symptoms by 90%  - Still feels tightness and wheezing  - Oxygen saturation: 97%  - Continue Symbicort  - Consider higher dose or alternative inhalers if symptoms persist    4. Sleep apnea  - Oxygen saturation: 97% history of asthma/smoking  - Small airway  - Order sleep study referral  - Wear watch to monitor sleep patterns    5. Weight management  - BMI: 35  - A1c: 5.0  - Prescribed Zepbound 2.5 mg  - Advised to visit [employer] website for coupon  - Ensure adequate protein intake for weight loss    PROCEDURE  Underwent ablation in October 2024.    Return if symptoms worsen or fail to improve.    TACO Guevara  03/13/2025    Patient or patient representative verbalized consent for the use of Ambient Listening during the visit with  TACO Guevara for chart documentation.

## 2025-04-21 DIAGNOSIS — J45.40 MODERATE PERSISTENT ASTHMA WITHOUT COMPLICATION: ICD-10-CM

## 2025-04-21 RX ORDER — BUDESONIDE AND FORMOTEROL FUMARATE DIHYDRATE 80; 4.5 UG/1; UG/1
2 AEROSOL RESPIRATORY (INHALATION)
Qty: 10.2 G | Refills: 6 | Status: SHIPPED | OUTPATIENT
Start: 2025-04-21

## 2025-05-14 ENCOUNTER — OFFICE VISIT (OUTPATIENT)
Dept: INTERNAL MEDICINE | Facility: CLINIC | Age: 55
End: 2025-05-14
Payer: COMMERCIAL

## 2025-05-14 VITALS
TEMPERATURE: 98.2 F | OXYGEN SATURATION: 97 % | BODY MASS INDEX: 36.49 KG/M2 | DIASTOLIC BLOOD PRESSURE: 82 MMHG | WEIGHT: 219 LBS | SYSTOLIC BLOOD PRESSURE: 104 MMHG | HEART RATE: 67 BPM | HEIGHT: 65 IN

## 2025-05-14 DIAGNOSIS — R76.8 POSITIVE ANA (ANTINUCLEAR ANTIBODY): ICD-10-CM

## 2025-05-14 DIAGNOSIS — Z12.31 ENCOUNTER FOR SCREENING MAMMOGRAM FOR MALIGNANT NEOPLASM OF BREAST: ICD-10-CM

## 2025-05-14 DIAGNOSIS — Z13.21 SCREENING FOR ENDOCRINE, NUTRITIONAL, METABOLIC AND IMMUNITY DISORDER: ICD-10-CM

## 2025-05-14 DIAGNOSIS — Z00.00 PHYSICAL EXAM, ANNUAL: Primary | ICD-10-CM

## 2025-05-14 DIAGNOSIS — E66.01 CLASS 2 SEVERE OBESITY DUE TO EXCESS CALORIES WITH SERIOUS COMORBIDITY AND BODY MASS INDEX (BMI) OF 35.0 TO 35.9 IN ADULT: ICD-10-CM

## 2025-05-14 DIAGNOSIS — L25.5 DERMATITIS DUE TO PLANTS, INCLUDING POISON IVY, SUMAC, AND OAK: ICD-10-CM

## 2025-05-14 DIAGNOSIS — Z13.228 SCREENING FOR ENDOCRINE, NUTRITIONAL, METABOLIC AND IMMUNITY DISORDER: ICD-10-CM

## 2025-05-14 DIAGNOSIS — E55.9 VITAMIN D INSUFFICIENCY: ICD-10-CM

## 2025-05-14 DIAGNOSIS — Z13.29 SCREENING FOR ENDOCRINE, NUTRITIONAL, METABOLIC AND IMMUNITY DISORDER: ICD-10-CM

## 2025-05-14 DIAGNOSIS — Z13.0 SCREENING FOR ENDOCRINE, NUTRITIONAL, METABOLIC AND IMMUNITY DISORDER: ICD-10-CM

## 2025-05-14 DIAGNOSIS — E66.812 CLASS 2 SEVERE OBESITY DUE TO EXCESS CALORIES WITH SERIOUS COMORBIDITY AND BODY MASS INDEX (BMI) OF 35.0 TO 35.9 IN ADULT: ICD-10-CM

## 2025-05-14 DIAGNOSIS — M79.672 LEFT FOOT PAIN: ICD-10-CM

## 2025-05-14 RX ORDER — METHYLPREDNISOLONE ACETATE 40 MG/ML
40 INJECTION, SUSPENSION INTRA-ARTICULAR; INTRALESIONAL; INTRAMUSCULAR; SOFT TISSUE ONCE
Status: COMPLETED | OUTPATIENT
Start: 2025-05-14 | End: 2025-05-14

## 2025-05-14 RX ORDER — TIRZEPATIDE 2.5 MG/.5ML
2.5 INJECTION, SOLUTION SUBCUTANEOUS WEEKLY
Qty: 2 ML | Refills: 2 | Status: SHIPPED | OUTPATIENT
Start: 2025-05-14

## 2025-05-14 RX ORDER — TRIAMCINOLONE ACETONIDE 1 MG/G
1 CREAM TOPICAL 2 TIMES DAILY
Qty: 80 G | Refills: 0 | Status: SHIPPED | OUTPATIENT
Start: 2025-05-14

## 2025-05-14 RX ADMIN — METHYLPREDNISOLONE ACETATE 40 MG: 40 INJECTION, SUSPENSION INTRA-ARTICULAR; INTRALESIONAL; INTRAMUSCULAR; SOFT TISSUE at 09:47

## 2025-05-14 NOTE — PROGRESS NOTES
Chief Complaint   Patient presents with    Annual Exam    Foot Pain     Left foot is hurting when standing off  and on [id number], worsening now     Poison Ivy     All over back of legs and butt and lower back        Luna Polk is a 54 y.o. female and is here for a comprehensive physical exam.   History of Present Illness  The patient presents for an annual physical and also wishes to discuss a rash from poison ivy and left foot pain.    She reports a recent encounter with poison ivy, resulting in a rash on her back, legs, and buttocks. She is concerned about the potential exacerbation of her symptoms with steroid treatment and has been using Benadryl lotion for symptom management. She recalls a previous treatment with Depo-Medrol.    She is scheduled for a Pap smear on 05/19/2025 and plans to arrange a mammogram. She has an upcoming sleep study appointment in 06/2025. She is current with her vision and dental check-ups, having recently undergone a dental cleaning. She maintains regular follow-ups with her rheumatologist. She underwent a colonoscopy last year, which yielded normal results. She had a dermatology appointment a few weeks ago, but it was canceled due to insurance issues.     She reports no chest pain or shortness of breath. She experiences occasional left lower quadrant pain, described as a dropping sensation. She retains her ovaries and reports normal bowel movements. She has a history of large fibroids.    She experiences intermittent pain in her left foot, which she describes as feeling like a broken bone when seated in certain positions. She is right-hand dominant. She recently noticed the loss of her little toenail, suggesting a possible stubbing incident. No imaging studies have been conducted on her foot.    FAMILY HISTORY  She does not have a family history of colon cancer.        History:  LMP: No LMP recorded (lmp unknown). Patient has had an ablation.  Last pap date: 2023  Abnormal pap?  yes  : 3  Para: 3  STD:none  Age of menarche:16  Sexually active:20  Abuse:none       Do you take any herbs or supplements that were not prescribed by a doctor? yes  Are you taking calcium supplements? no  Are you taking aspirin daily? no      Health Habits:  Dental Exam. up to date  Eye Exam. up to date  Dermatology.not up to date - advised patient to schedule or closely monitor for changes in skin lesions  Exercise: 4 times/week.  Current exercise activities include: walking    Health Maintenance   Topic Date Due    MAMMOGRAM  Never done    COLORECTAL CANCER SCREENING  Never done    ANNUAL PHYSICAL  2025    COVID-19 Vaccine ( - - season) 2026 (Originally 2024)    Pneumococcal Vaccine 50+ (1 of 2 - PCV) 2026 (Originally 1991)    ZOSTER VACCINE (1 of 2) 2026 (Originally 2022)    INFLUENZA VACCINE  2025    PAP SMEAR  10/01/2026    TDAP/TD VACCINES (4 - Td or Tdap) 2031    HEPATITIS C SCREENING  Completed       PMH, PSH, SocHx, FamHx, Allergies, and Medications: Reviewed and updated in the Visit Navigator.     Allergies   Allergen Reactions    Morphine Rash and Swelling    Penicillins Rash and Swelling    Apple Nausea And Vomiting    Gluten Meal Itching    Milk-Related Compounds Diarrhea    Mushroom Diarrhea    Sesame Seed Diarrhea    Soy Allergy (Obsolete) Diarrhea     Past Medical History:   Diagnosis Date    Uterine fibroid      Past Surgical History:   Procedure Laterality Date     SECTION      UTERINE FIBROID SURGERY       Social History     Socioeconomic History    Marital status:    Tobacco Use    Smoking status: Former     Current packs/day: 0.00     Average packs/day: 1 pack/day for 10.8 years (10.8 ttl pk-yrs)     Types: Cigarettes     Start date:      Quit date: 1999     Years since quittin.5    Smokeless tobacco: Never   Vaping Use    Vaping status: Never Used   Substance and Sexual Activity    Alcohol use: Yes  "    Comment: occ    Drug use: Defer    Sexual activity: Defer     Family History   Problem Relation Age of Onset    Dementia Mother     Lung cancer Father        Review of Systems  Review of Systems      Vitals:    05/14/25 0843   BP: 104/82   Pulse: 67   Temp: 98.2 °F (36.8 °C)   SpO2: 97%       Objective   /82   Pulse 67   Temp 98.2 °F (36.8 °C)   Ht 165.1 cm (65\")   Wt 99.3 kg (219 lb)   LMP  (LMP Unknown)   SpO2 97%   BMI 36.44 kg/m²           Physical Exam  Vitals and nursing note reviewed.   Constitutional:       General: She is not in acute distress.     Appearance: Normal appearance. She is well-developed. She is obese.   HENT:      Head: Normocephalic and atraumatic.      Right Ear: Hearing, tympanic membrane and ear canal normal.      Left Ear: Hearing, tympanic membrane and ear canal normal.      Nose: Nose normal.      Mouth/Throat:      Mouth: Mucous membranes are dry.      Dentition: Normal dentition.   Eyes:      Conjunctiva/sclera: Conjunctivae normal.      Pupils: Pupils are equal, round, and reactive to light.   Neck:      Thyroid: No thyroid mass or thyromegaly.      Vascular: No carotid bruit or JVD.   Cardiovascular:      Rate and Rhythm: Normal rate and regular rhythm.      Pulses: Normal pulses.      Heart sounds: Normal heart sounds, S1 normal and S2 normal. No murmur heard.  Pulmonary:      Effort: Pulmonary effort is normal.      Breath sounds: Normal breath sounds.   Abdominal:      General: Bowel sounds are normal. There is no distension or abdominal bruit.      Palpations: Abdomen is soft. There is no mass.      Tenderness: There is no abdominal tenderness. There is no right CVA tenderness, left CVA tenderness, guarding or rebound.      Hernia: No hernia is present.   Genitourinary:     Comments: deferred  Musculoskeletal:         General: Tenderness and deformity present. Normal range of motion.      Cervical back: Normal range of motion and neck supple.   Lymphadenopathy: "      Cervical: No cervical adenopathy.   Skin:     General: Skin is warm and dry.      Capillary Refill: Capillary refill takes less than 2 seconds.      Findings: Lesion present. No rash.      Nails: There is no clubbing.   Neurological:      Mental Status: She is alert and oriented to person, place, and time.      Cranial Nerves: No cranial nerve deficit.      Sensory: No sensory deficit.      Gait: Gait normal.   Psychiatric:         Behavior: Behavior normal.         Thought Content: Thought content normal.         Judgment: Judgment normal.              Assessment & Plan   1. Healthy female exam.    2. Patient Counseling: Including but not Limited to the following, when appropriate:  --Nutrition: Stressed importance of moderation in sodium/caffeine intake, saturated fat and cholesterol, caloric balance, sufficient intake of fresh fruits, vegetables, fiber, calcium, iron, and 1 mg of folate supplement per day (for females capable of pregnancy).  --Discussed the issue of estrogen replacement, calcium supplement, and the daily use of baby aspirin.  --Exercise: Stressed the importance of regular exercise.   --Substance Abuse: Discussed cessation/primary prevention of tobacco, alcohol, or other drug use; driving or other dangerous activities under the influence; availability of treatment for abuse, as indicated based on social history.    --Sexuality: Discussed sexually transmitted diseases, partner selection, use of condoms, avoidance of unintended pregnancy  and contraceptive alternatives.   --Injury prevention: Discussed safety belts, safety helmets, smoke detector, smoking near bedding or upholstery.   --Dental health: Discussed importance of regular tooth brushing, flossing, and dental visits.  --Immunizations reviewed.  --Discussed benefits of colon cancer screening.      3. Discussed the patient's BMI with her.  The BMI is above average; BMI management plan is completed  4. Return in about 4 weeks (around  6/11/2025), or if symptoms worsen or fail to improve.  5. Age-appropriate Screening Scheduled      Assessment & Plan   Assessment & Plan  1. Poison Ivy.  - Presents with a rash on her back, legs, and buttocks, consistent with poison ivy exposure.  - Comprehensive blood work panel will be ordered prior to the administration of a Depo-Medrol injection.  - Advised to maintain good hygiene practices, including washing hands, phone, and sheets, and to use Ana Luisa dishwashing liquid.  - Prescription for triamcinolone cream provided, with instructions to avoid facial application. Cautioned against scratching the affected areas.    2. Health Maintenance.  - Scheduled for a Pap smear on 05/19/2025 and advised to schedule a mammogram online while awaiting blood work results.  - Prescription for Zepbound issued through [facility].  - Instructed to provide the report from her previous colonoscopy for scanning purposes.  - Encouraged to maintain good posture, perform regular breast self-examinations, engage in regular exercise, and ensure adequate protein intake.    3. Left Foot Pain.  - Reports intermittent pain in her left foot, which may be due to bone spurs.  - Advised to wear more supportive footwear.  - If symptoms worsen, she will inform via [employer], at which point a referral to a podiatrist will be considered.    Diagnoses and all orders for this visit:    1. Physical exam, annual (Primary)  -     Comprehensive Metabolic Panel  -     Lipid Panel  -     CBC Auto Differential    2. Vitamin D insufficiency  -     Vitamin D,25-Hydroxy    3. Screening for endocrine, nutritional, metabolic and immunity disorder  -     Hemoglobin A1c  -     Vitamin B12  -     TSH  -     T4, Free  -     Folate    4. Encounter for screening mammogram for malignant neoplasm of breast  -     Mammo Screening Digital Tomosynthesis Bilateral With CAD    5. Class 2 severe obesity due to excess calories with serious comorbidity and body mass  index (BMI) of 35.0 to 35.9 in adult  -     Tirzepatide-Weight Management (Zepbound) 2.5 MG/0.5ML solution; Inject 0.5 mL under the skin into the appropriate area as directed 1 (One) Time Per Week.  Dispense: 2 mL; Refill: 2    6. Dermatitis due to plants, including poison ivy, sumac, and oak  -     triamcinolone (KENALOG) 0.1 % cream; Apply 1 Application topically to the appropriate area as directed 2 (Two) Times a Day.  Dispense: 80 g; Refill: 0  -     methylPREDNISolone acetate (DEPO-medrol) injection 40 mg    7. Positive EMANUEL (antinuclear antibody)    8. Left foot pain    Other orders  -     CBC & Differential             Patient or patient representative verbalized consent for the use of Ambient Listening during the visit with  TACO Guevara for chart documentation.       TACO Guevara 05/14/2025

## 2025-05-15 LAB
25(OH)D3+25(OH)D2 SERPL-MCNC: 49.1 NG/ML (ref 30–100)
ALBUMIN SERPL-MCNC: 4.5 G/DL (ref 3.5–5.2)
ALBUMIN/GLOB SERPL: 1.7 G/DL
ALP SERPL-CCNC: 60 U/L (ref 39–117)
ALT SERPL-CCNC: 12 U/L (ref 1–33)
AST SERPL-CCNC: 18 U/L (ref 1–32)
BASOPHILS # BLD AUTO: 0.03 10*3/MM3 (ref 0–0.2)
BASOPHILS NFR BLD AUTO: 0.8 % (ref 0–1.5)
BILIRUB SERPL-MCNC: 0.5 MG/DL (ref 0–1.2)
BUN SERPL-MCNC: 16 MG/DL (ref 6–20)
BUN/CREAT SERPL: 20 (ref 7–25)
CALCIUM SERPL-MCNC: 9.5 MG/DL (ref 8.6–10.5)
CHLORIDE SERPL-SCNC: 105 MMOL/L (ref 98–107)
CHOLEST SERPL-MCNC: 245 MG/DL (ref 0–200)
CO2 SERPL-SCNC: 25.6 MMOL/L (ref 22–29)
CREAT SERPL-MCNC: 0.8 MG/DL (ref 0.57–1)
EGFRCR SERPLBLD CKD-EPI 2021: 88.8 ML/MIN/1.73
EOSINOPHIL # BLD AUTO: 0.07 10*3/MM3 (ref 0–0.4)
EOSINOPHIL NFR BLD AUTO: 1.8 % (ref 0.3–6.2)
ERYTHROCYTE [DISTWIDTH] IN BLOOD BY AUTOMATED COUNT: 12 % (ref 12.3–15.4)
FOLATE SERPL-MCNC: 12.2 NG/ML (ref 4.78–24.2)
GLOBULIN SER CALC-MCNC: 2.7 GM/DL
GLUCOSE SERPL-MCNC: 86 MG/DL (ref 65–99)
HBA1C MFR BLD: 5.4 % (ref 4.8–5.6)
HCT VFR BLD AUTO: 46.8 % (ref 34–46.6)
HDLC SERPL-MCNC: 93 MG/DL (ref 40–60)
HGB BLD-MCNC: 15.6 G/DL (ref 12–15.9)
IMM GRANULOCYTES # BLD AUTO: 0.01 10*3/MM3 (ref 0–0.05)
IMM GRANULOCYTES NFR BLD AUTO: 0.3 % (ref 0–0.5)
LDLC SERPL CALC-MCNC: 137 MG/DL (ref 0–100)
LYMPHOCYTES # BLD AUTO: 1.56 10*3/MM3 (ref 0.7–3.1)
LYMPHOCYTES NFR BLD AUTO: 39.4 % (ref 19.6–45.3)
MCH RBC QN AUTO: 30.5 PG (ref 26.6–33)
MCHC RBC AUTO-ENTMCNC: 33.3 G/DL (ref 31.5–35.7)
MCV RBC AUTO: 91.4 FL (ref 79–97)
MONOCYTES # BLD AUTO: 0.23 10*3/MM3 (ref 0.1–0.9)
MONOCYTES NFR BLD AUTO: 5.8 % (ref 5–12)
NEUTROPHILS # BLD AUTO: 2.06 10*3/MM3 (ref 1.7–7)
NEUTROPHILS NFR BLD AUTO: 51.9 % (ref 42.7–76)
NRBC BLD AUTO-RTO: 0 /100 WBC (ref 0–0.2)
PLATELET # BLD AUTO: 201 10*3/MM3 (ref 140–450)
POTASSIUM SERPL-SCNC: 4.3 MMOL/L (ref 3.5–5.2)
PROT SERPL-MCNC: 7.2 G/DL (ref 6–8.5)
RBC # BLD AUTO: 5.12 10*6/MM3 (ref 3.77–5.28)
SODIUM SERPL-SCNC: 141 MMOL/L (ref 136–145)
T4 FREE SERPL-MCNC: 1.16 NG/DL (ref 0.92–1.68)
TRIGL SERPL-MCNC: 87 MG/DL (ref 0–150)
TSH SERPL DL<=0.005 MIU/L-ACNC: 1.09 UIU/ML (ref 0.27–4.2)
VIT B12 SERPL-MCNC: >2000 PG/ML (ref 211–946)
VLDLC SERPL CALC-MCNC: 15 MG/DL (ref 5–40)
WBC # BLD AUTO: 3.96 10*3/MM3 (ref 3.4–10.8)

## 2025-05-18 ENCOUNTER — RESULTS FOLLOW-UP (OUTPATIENT)
Dept: INTERNAL MEDICINE | Facility: CLINIC | Age: 55
End: 2025-05-18
Payer: COMMERCIAL

## 2025-05-19 NOTE — PROGRESS NOTES
Please contact patient and let her know that labs are unremarkable with exception of her total cholesterol is elevated along with LDL which is the bad cholesterol recommend dietary modifications along with exercise.  Her B12 is significantly elevated and recommend decreasing B12.  Hematocrit is slightly elevated.

## 2025-05-22 DIAGNOSIS — R76.8 POSITIVE ANA (ANTINUCLEAR ANTIBODY): ICD-10-CM

## 2025-05-22 DIAGNOSIS — M79.672 LEFT FOOT PAIN: Primary | ICD-10-CM

## 2025-06-23 ENCOUNTER — OFFICE VISIT (OUTPATIENT)
Dept: SLEEP MEDICINE | Facility: CLINIC | Age: 55
End: 2025-06-23
Payer: COMMERCIAL

## 2025-06-23 VITALS
TEMPERATURE: 98.4 F | OXYGEN SATURATION: 97 % | DIASTOLIC BLOOD PRESSURE: 66 MMHG | HEIGHT: 65 IN | BODY MASS INDEX: 36.15 KG/M2 | SYSTOLIC BLOOD PRESSURE: 120 MMHG | WEIGHT: 217 LBS | HEART RATE: 66 BPM

## 2025-06-23 DIAGNOSIS — R06.83 SNORING: ICD-10-CM

## 2025-06-23 DIAGNOSIS — R41.840 LACK OF CONCENTRATION: ICD-10-CM

## 2025-06-23 DIAGNOSIS — G47.19 EXCESSIVE DAYTIME SLEEPINESS: Primary | ICD-10-CM

## 2025-06-23 DIAGNOSIS — E66.9 OBESITY (BMI 30-39.9): ICD-10-CM

## 2025-06-23 DIAGNOSIS — R00.2 PALPITATIONS: ICD-10-CM

## 2025-06-23 PROCEDURE — 99213 OFFICE O/P EST LOW 20 MIN: CPT | Performed by: NURSE PRACTITIONER

## 2025-06-23 NOTE — PROGRESS NOTES
Chief Complaint:   Chief Complaint   Patient presents with    Sleeping Problem       HPI:    Luna Polk is a 54 y.o. female here to establish care.  Patient sees TACO Brown for care.  Patient has medical history as below:  Past Medical History:   Diagnosis Date    Uterine fibroid          Patient states she was recently diagnosed with lupus and is seeing several doctors just for health maintenance.  She does have a 1 year history of snoring, excessive daytime sleepiness, heartburn, dry mouth, teeth grinding, difficulty staying asleep and lack of concentration.  Patient also has a new diagnosis of asthma.  Patient is here today to see if sleep apnea is playing a role in current symptoms any past or present diagnoses.    Patient keeps his same sleep schedule weekday and weekend going to bed between 9 and 10 PM getting up between 6 and 7 AM.  She estimates getting 8 hours of sleep nightly.  She goes to sleep quickly and is up 1-2 times during the night.  She will take a 30-minute to 1 hour nap when possible.  Patient has an Claude score of 10/24.  Sleep apnea risk factors:  Patient has symptoms Snoring, Restless sleep, Daytime sleepiness, and Frequent arousal from sleep  suggestive of the presence of obstructive sleep apnea. The patient has the following co-morbid conditions of asthma and morbidly obese (BMI > 40 or > 35 with obesity - related health condition). We did speak today about the consequences of untreated sleep apnea worsening the previously listed co-morbidities and sudden cardiac death.  We also discussed different therapies available to him such as CPAP, MAD, or ENT referral.  Patient verbalizes understanding.  Social history  This very pleasant 54-year-old female.  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  She is a non-smoker and will have an alcoholic beverage monthly or less.  She has 1 to 2 cups of regular coffee daily.    Family History   Problem Relation Age of  "Onset    Dementia Mother     Lung cancer Father      Past Surgical History:   Procedure Laterality Date     SECTION      UTERINE FIBROID SURGERY         Current medications are:   Current Outpatient Medications:     hydroxychloroquine (PLAQUENIL) 200 MG tablet, Take  by mouth 2 (Two) Times a Day., Disp: , Rfl:     Tirzepatide-Weight Management (Zepbound) 2.5 MG/0.5ML solution, Inject 0.5 mL under the skin into the appropriate area as directed 1 (One) Time Per Week., Disp: 2 mL, Rfl: 2.      The patient's relevant past medical, surgical, family and social history were reviewed and updated in Epic as appropriate.       Review of Systems   Constitutional:  Positive for fatigue.   HENT:  Positive for tinnitus.    Eyes:  Positive for visual disturbance.   Respiratory:  Positive for wheezing.    Cardiovascular:  Positive for palpitations.   Endocrine: Positive for cold intolerance.   Musculoskeletal:  Positive for arthralgias, back pain, joint swelling and myalgias.   Allergic/Immunologic: Positive for environmental allergies, food allergies and immunocompromised state.   Psychiatric/Behavioral:  Positive for sleep disturbance.    All other systems reviewed and are negative.      /66   Pulse 66   Temp 98.4 °F (36.9 °C)   Ht 165.1 cm (65\")   Wt 98.4 kg (217 lb)   SpO2 97%   BMI 36.11 kg/m²     Objective:    Physical Exam  Constitutional:       Appearance: Normal appearance.   HENT:      Head: Normocephalic and atraumatic.      Mouth/Throat:      Mouth: Mucous membranes are moist.      Pharynx: Oropharynx is clear.      Comments: Mallampati 2 anatomy  Cardiovascular:      Rate and Rhythm: Regular rhythm. Bradycardia present.   Pulmonary:      Effort: Pulmonary effort is normal.      Breath sounds: Normal breath sounds.   Skin:     General: Skin is warm and dry.   Neurological:      Mental Status: She is alert and oriented to person, place, and time.   Psychiatric:         Mood and Affect: Mood normal.    "      Behavior: Behavior normal.         Thought Content: Thought content normal.         Judgment: Judgment normal.             ASSESSMENT/PLAN    Diagnoses and all orders for this visit:    1. Excessive daytime sleepiness (Primary)  -     Home Sleep Study; Future    2. Snoring  -     Home Sleep Study; Future    3. Lack of concentration  -     Home Sleep Study; Future    4. Palpitations  -     Home Sleep Study; Future    5. Obesity (BMI 30-39.9)  -     Home Sleep Study; Future        Counseled patient regarding multimodal approach with healthy nutrition, healthy sleep, regular physical activity, social activities, counseling, and medications. Encouraged to practice lateral sleep position. Avoid alcohol and sedatives close to bedtime.  Patient certainly has a strong story for sleep apnea and due to the consequences of untreated sleep apnea we will move forward with HST and follow-up as appropriate.    Thank you for this kind referral.    Signed by  TACO Banegas    June 23, 2025      CC: Anabel Villegas, Anabel Ramirez, AP*

## 2025-08-18 ENCOUNTER — TELEMEDICINE (OUTPATIENT)
Dept: INTERNAL MEDICINE | Facility: CLINIC | Age: 55
End: 2025-08-18
Payer: COMMERCIAL

## 2025-08-18 VITALS — WEIGHT: 215 LBS | HEIGHT: 65 IN | BODY MASS INDEX: 35.82 KG/M2

## 2025-08-18 DIAGNOSIS — Z79.890 ON HORMONE REPLACEMENT THERAPY: ICD-10-CM

## 2025-08-18 DIAGNOSIS — E66.01 CLASS 2 SEVERE OBESITY DUE TO EXCESS CALORIES WITH SERIOUS COMORBIDITY AND BODY MASS INDEX (BMI) OF 35.0 TO 35.9 IN ADULT: Primary | ICD-10-CM

## 2025-08-18 DIAGNOSIS — E66.812 CLASS 2 SEVERE OBESITY DUE TO EXCESS CALORIES WITH SERIOUS COMORBIDITY AND BODY MASS INDEX (BMI) OF 35.0 TO 35.9 IN ADULT: Primary | ICD-10-CM

## 2025-08-18 PROCEDURE — 1159F MED LIST DOCD IN RCRD: CPT | Performed by: NURSE PRACTITIONER

## 2025-08-18 PROCEDURE — 99213 OFFICE O/P EST LOW 20 MIN: CPT | Performed by: NURSE PRACTITIONER

## 2025-08-18 PROCEDURE — 1126F AMNT PAIN NOTED NONE PRSNT: CPT | Performed by: NURSE PRACTITIONER

## 2025-08-18 PROCEDURE — 1160F RVW MEDS BY RX/DR IN RCRD: CPT | Performed by: NURSE PRACTITIONER

## 2025-08-18 RX ORDER — TESTOSTERONE 20.25 MG/1.25G
GEL TOPICAL
COMMUNITY
Start: 2025-07-22

## 2025-08-18 RX ORDER — TIRZEPATIDE 5 MG/.5ML
5 INJECTION, SOLUTION SUBCUTANEOUS WEEKLY
Qty: 2 ML | Refills: 1 | Status: SHIPPED | OUTPATIENT
Start: 2025-08-18

## 2025-08-18 RX ORDER — ESTRADIOL 0.1 MG/D
1 FILM, EXTENDED RELEASE TRANSDERMAL 2 TIMES WEEKLY
COMMUNITY

## 2025-08-19 ENCOUNTER — HOSPITAL ENCOUNTER (OUTPATIENT)
Dept: SLEEP MEDICINE | Facility: HOSPITAL | Age: 55
Discharge: HOME | End: 2025-08-19
Admitting: NURSE PRACTITIONER
Payer: COMMERCIAL

## 2025-08-19 VITALS — WEIGHT: 216.93 LBS | HEIGHT: 65 IN | BODY MASS INDEX: 36.14 KG/M2

## 2025-08-19 DIAGNOSIS — G47.19 EXCESSIVE DAYTIME SLEEPINESS: ICD-10-CM

## 2025-08-19 DIAGNOSIS — R00.2 PALPITATIONS: ICD-10-CM

## 2025-08-19 DIAGNOSIS — R41.840 LACK OF CONCENTRATION: ICD-10-CM

## 2025-08-19 DIAGNOSIS — E66.9 OBESITY (BMI 30-39.9): ICD-10-CM

## 2025-08-19 DIAGNOSIS — R06.83 SNORING: ICD-10-CM

## 2025-08-19 PROCEDURE — G0399 HOME SLEEP TEST/TYPE 3 PORTA: HCPCS
